# Patient Record
Sex: FEMALE | Race: WHITE | Employment: OTHER | URBAN - METROPOLITAN AREA
[De-identification: names, ages, dates, MRNs, and addresses within clinical notes are randomized per-mention and may not be internally consistent; named-entity substitution may affect disease eponyms.]

---

## 2023-04-18 ENCOUNTER — OFFICE VISIT (OUTPATIENT)
Dept: ENT CLINIC | Age: 68
End: 2023-04-18

## 2023-04-18 ENCOUNTER — OFFICE VISIT (OUTPATIENT)
Dept: ENT CLINIC | Age: 68
End: 2023-04-18
Payer: MEDICARE

## 2023-04-18 VITALS
SYSTOLIC BLOOD PRESSURE: 120 MMHG | RESPIRATION RATE: 20 BRPM | DIASTOLIC BLOOD PRESSURE: 80 MMHG | BODY MASS INDEX: 39.1 KG/M2 | HEART RATE: 92 BPM | WEIGHT: 258 LBS | HEIGHT: 68 IN | OXYGEN SATURATION: 97 %

## 2023-04-18 DIAGNOSIS — H93.8X2 PLUGGED FEELING IN EAR, LEFT: ICD-10-CM

## 2023-04-18 DIAGNOSIS — H90.3 SENSORINEURAL HEARING LOSS (SNHL) OF BOTH EARS: Primary | ICD-10-CM

## 2023-04-18 DIAGNOSIS — H90.3 ASYMMETRIC SNHL (SENSORINEURAL HEARING LOSS): ICD-10-CM

## 2023-04-18 PROCEDURE — 1101F PT FALLS ASSESS-DOCD LE1/YR: CPT | Performed by: OTOLARYNGOLOGY

## 2023-04-18 PROCEDURE — G8427 DOCREV CUR MEDS BY ELIG CLIN: HCPCS | Performed by: OTOLARYNGOLOGY

## 2023-04-18 PROCEDURE — 99213 OFFICE O/P EST LOW 20 MIN: CPT | Performed by: OTOLARYNGOLOGY

## 2023-04-18 PROCEDURE — 1123F ACP DISCUSS/DSCN MKR DOCD: CPT | Performed by: OTOLARYNGOLOGY

## 2023-04-18 PROCEDURE — G8536 NO DOC ELDER MAL SCRN: HCPCS | Performed by: OTOLARYNGOLOGY

## 2023-04-18 PROCEDURE — 3017F COLORECTAL CA SCREEN DOC REV: CPT | Performed by: OTOLARYNGOLOGY

## 2023-04-18 PROCEDURE — G8417 CALC BMI ABV UP PARAM F/U: HCPCS | Performed by: OTOLARYNGOLOGY

## 2023-04-18 PROCEDURE — G8510 SCR DEP NEG, NO PLAN REQD: HCPCS | Performed by: OTOLARYNGOLOGY

## 2023-04-18 PROCEDURE — 1090F PRES/ABSN URINE INCON ASSESS: CPT | Performed by: OTOLARYNGOLOGY

## 2023-04-18 PROCEDURE — G8400 PT W/DXA NO RESULTS DOC: HCPCS | Performed by: OTOLARYNGOLOGY

## 2023-04-18 PROCEDURE — 92557 COMPREHENSIVE HEARING TEST: CPT | Performed by: AUDIOLOGIST

## 2023-04-18 NOTE — PROGRESS NOTES
Juan Pablo Cortez, a 78y.o. year old female, was seen in ENT clinic today for a hearing evaluation on referral from Dr. Esdras Fong. Patient complains of pain and hearing loss (L>R). Patient was last seen for audiogram on 7-7-2022. Results at the time of testing indicated bilateral sensorineural hearing loss (asymmetry, L>R) with a Type C tympanogram in the left ear. Patient reports since her previous appointment where tympanometry was performed, her left ear is more \"achy\" with subjectively worsened hearing. Otoscopy: normal external ear canals and visible tympanic membranes, bilaterally. Left TM appears retracted. Tympanometry: DNT (patient unable to tolerate)    SRT: RE Speech Reception Threshold (SRT) was obtained at 10 dBHL LE Speech Reception Threshold (SRT) was obtained at 25 dBHL    WRS: RE Excellent in quiet when words were presented at 50 dBHL. WRS: LE Excellent in quiet when words were presented at 65 dBHL. Pure tone audiometry:  RE: WNL sloping to moderate-severe sensorineural hearing loss  LE: WNL sloping to moderate-severe sensorineural hearing loss    Sensorineural hearing loss, bilaterally. Asymmetry (L>R) is slightly worse in the low frequencies but overall appears stable compared to previous audiogram 7-7-2022. Impressions:  hearing loss requiring medical/otologic and audiologic follow-up    Plan:  Follow-up with ENT. Hearing aid evaluation recommended. Repeat audiogram 1 year or sooner if change is noted.     Michaelle Rock   Doctor of Audiology

## 2023-04-19 NOTE — PROGRESS NOTES
Otolaryngology-Head and Neck Surgery  Follow Up Patient Visit     Patient: Awilda Powell  YOB: 1955  MRN: [id number]  Date of Service: 4/18/2023      Chief Complaint:   Chief Complaint   Patient presents with    Follow-up     Hearing test results     Interval hx 4/18/2023  No issues since LOV  Does still have left ear plugging which she feels started after left tympanometry    History of Present Illness: Awilda Powell is a 78y.o. year old female who presents today for discussion of her ears    Has noticed some hearing loss for some time (probably back to 2008)  Admits to some noise exposure (loud music, concert etc) over the years   Has had family members complain re: her hearing    She had an audiogram with Dr Judit Blount last month and was referred to discuss findings    Since the hearing test has noticed left ear plugging sensation. Prior to that, no eustachian tube concerns       Past Medical History:  Past Medical History:   Diagnosis Date    CAD (coronary artery disease) 2008    MI    Stroke ([facility] [address] 2005       Past Surgical History:   Past Surgical History:   Procedure Laterality Date    HX ORTHOPAEDIC      removal of cyst form r and l feet       Medications:   Current Outpatient Medications   Medication Instructions    Aspirin, Buffered 81 mg tab Take  by mouth. cholecalciferol, vitamin D3, 50 mcg (2,000 unit) tab Oral    clopidogreL (PLAVIX) 75 mg tab Oral, DAILY    cyanocobalamin 1,000 mcg, Oral, DAILY    folic acid (FOLVITE) 1 mg tablet Oral, DAILY    HYDROCHLOROTHIAZIDE PO Oral    metoprolol (LOPRESSOR) 25 mg tablet Oral, 2 TIMES DAILY    pyridoxine (vitamin B6) (VITAMIN B-6) 25 mg, Oral, DAILY    SIMVASTATIN PO Oral    telmisartan (MICARDIS) 40 mg, Oral, DAILY       Allergies:    Allergies   Allergen Reactions    Codeine Nausea and Vomiting    Epinephrine Palpitations       Social History:   Social History     Tobacco Use    Smoking status: Every Day     Packs/day: 1.00     Types: Cigarettes    Smokeless tobacco: Current    Tobacco comments:     cigars   Vaping Use    Vaping Use: Never used   Substance Use Topics    Alcohol use: Yes     Comment: OCC    Drug use: Never        Family History:  Family History   Problem Relation Age of Onset    Heart Disease Father        Review of Systems:    Consitutional: denies fever, excessive weight gain or loss. Eyes: denies diplopia, eye pain. Integumentary: denies new concerning skin lesions. Ears, Nose, Mouth, Throat: denies except as per HPI. Endocrine: denies hot or cold intolerance, increased thirst.  Respiratory: denies cough, hemoptysis, wheezing  Gastrointestinal: denies trouble swallowing, nausea, emesis, regurgitation  Musculoskeletal: denies muscle weakness or wasting  Cardiovascular: denies chest pain, shortness of breath  Neurologic: denies seizures, numbness or tingling, syncope  Hematologic: denies easy bleeding or bruising    Physical Examination:   Vitals:    04/18/23 1443   BP: 120/80   Pulse: 92   Resp: 20   Height: 5' 8\" (1.727 m)   Weight: 258 lb (117 kg)   SpO2: 97%        General: Comfortable, pleasant, appears stated age  Voice: Strong, speaking in full sentences, no stridor    Face: No masses or lesions, facial strength symmetric   Ears: External ears unremarkable. Bilateral ear canal clear. Tympanic membrane clear and intact, with visible landmarks. Clear middle ear space  Nose: External nose unremarkable. Dorsum midline. Anterior rhinoscopy demonstrates no lesions. Septum midline. Turbinates without hypertrophy. Oral Cavity / Oropharynx: No trismus. Mucosa pink and moist. No lesions. Tongue is midline and mobile. Palate elevates symmetrically. Uvula midline. Tonsils unremarkable. Base of tongue soft. Floor of mouth soft. Neck: Supple. No adenopathy. Thyroid unremarkable. Palpable laryngeal landmarks. Full neck range of motion   Neurologic: CN II - XI intact.  Normal gait          Assessment and Plan:   Asymmetric SNHL  Left ear plugging  Left ETD  - Reviewed audiogram findings with patient  - Bilateral SNHL, mild asymmetry in the left ear  - This is very stable from last visit   - Suspect left ear plugging may be related to ETD  - Discussed option of medication Rx or trial myringotomy, but she defers for the time being  - Reviewed role of hearing aids which she will consider  - Follow up in 1 year     The patient was instructed to return to clinic if no improvement or progression of symptoms. Signs to watch out for reviewed.       MD [facility]  [address] [place] [address]  Office Phone: [phone]

## 2023-05-20 RX ORDER — FOLIC ACID 1 MG/1
TABLET ORAL DAILY
COMMUNITY

## 2023-05-20 RX ORDER — PYRIDOXINE HCL (VITAMIN B6) 25 MG
25 TABLET ORAL DAILY
COMMUNITY

## 2023-05-20 RX ORDER — TELMISARTAN 40 MG/1
40 TABLET ORAL DAILY
COMMUNITY

## 2023-05-20 RX ORDER — CLOPIDOGREL BISULFATE 75 MG/1
TABLET ORAL DAILY
COMMUNITY

## 2023-12-11 ENCOUNTER — ANESTHESIA (OUTPATIENT)
Facility: HOSPITAL | Age: 68
End: 2023-12-11
Payer: MEDICARE

## 2023-12-11 ENCOUNTER — ANESTHESIA EVENT (OUTPATIENT)
Facility: HOSPITAL | Age: 68
End: 2023-12-11
Payer: MEDICARE

## 2023-12-11 ENCOUNTER — HOSPITAL ENCOUNTER (OUTPATIENT)
Facility: HOSPITAL | Age: 68
Setting detail: OUTPATIENT SURGERY
Discharge: HOME | End: 2023-12-11
Attending: INTERNAL MEDICINE | Admitting: INTERNAL MEDICINE
Payer: MEDICARE

## 2023-12-11 VITALS
DIASTOLIC BLOOD PRESSURE: 71 MMHG | HEIGHT: 68 IN | RESPIRATION RATE: 18 BRPM | WEIGHT: 246.03 LBS | HEART RATE: 63 BPM | TEMPERATURE: 97.8 F | OXYGEN SATURATION: 98 % | SYSTOLIC BLOOD PRESSURE: 128 MMHG | BODY MASS INDEX: 37.29 KG/M2

## 2023-12-11 PROCEDURE — 6360000002 HC RX W HCPCS: Performed by: NURSE ANESTHETIST, CERTIFIED REGISTERED

## 2023-12-11 PROCEDURE — 3600007502: Performed by: INTERNAL MEDICINE

## 2023-12-11 PROCEDURE — 3700000000 HC ANESTHESIA ATTENDED CARE: Performed by: INTERNAL MEDICINE

## 2023-12-11 PROCEDURE — 88305 TISSUE EXAM BY PATHOLOGIST: CPT

## 2023-12-11 PROCEDURE — 7100000010 HC PHASE II RECOVERY - FIRST 15 MIN: Performed by: INTERNAL MEDICINE

## 2023-12-11 PROCEDURE — 3700000001 HC ADD 15 MINUTES (ANESTHESIA): Performed by: INTERNAL MEDICINE

## 2023-12-11 PROCEDURE — 3600007512: Performed by: INTERNAL MEDICINE

## 2023-12-11 PROCEDURE — 2580000003 HC RX 258: Performed by: INTERNAL MEDICINE

## 2023-12-11 PROCEDURE — 7100000011 HC PHASE II RECOVERY - ADDTL 15 MIN: Performed by: INTERNAL MEDICINE

## 2023-12-11 RX ORDER — ZINC GLUCONATE 50 MG
50 TABLET ORAL DAILY
COMMUNITY

## 2023-12-11 RX ORDER — ALPRAZOLAM 0.25 MG/1
0.25 TABLET ORAL 2 TIMES DAILY
COMMUNITY
Start: 2021-12-02

## 2023-12-11 RX ORDER — SODIUM CHLORIDE 0.9 % (FLUSH) 0.9 %
5-40 SYRINGE (ML) INJECTION EVERY 12 HOURS SCHEDULED
Status: DISCONTINUED | OUTPATIENT
Start: 2023-12-11 | End: 2023-12-11 | Stop reason: HOSPADM

## 2023-12-11 RX ORDER — METOPROLOL SUCCINATE 50 MG/1
50 TABLET, EXTENDED RELEASE ORAL DAILY
COMMUNITY
Start: 2023-11-01

## 2023-12-11 RX ORDER — ROSUVASTATIN CALCIUM 20 MG/1
20 TABLET, COATED ORAL
COMMUNITY
Start: 2021-10-09

## 2023-12-11 RX ORDER — PROPOFOL 10 MG/ML
INJECTION, EMULSION INTRAVENOUS CONTINUOUS PRN
Status: DISCONTINUED | OUTPATIENT
Start: 2023-12-11 | End: 2023-12-11 | Stop reason: SDUPTHER

## 2023-12-11 RX ORDER — OMEPRAZOLE 40 MG/1
40 CAPSULE, DELAYED RELEASE ORAL DAILY
COMMUNITY
Start: 2021-10-15

## 2023-12-11 RX ORDER — SODIUM CHLORIDE 0.9 % (FLUSH) 0.9 %
5-40 SYRINGE (ML) INJECTION PRN
Status: DISCONTINUED | OUTPATIENT
Start: 2023-12-11 | End: 2023-12-11 | Stop reason: HOSPADM

## 2023-12-11 RX ORDER — LIDOCAINE HYDROCHLORIDE 20 MG/ML
INJECTION, SOLUTION INTRAVENOUS PRN
Status: DISCONTINUED | OUTPATIENT
Start: 2023-12-11 | End: 2023-12-11 | Stop reason: SDUPTHER

## 2023-12-11 RX ORDER — SODIUM CHLORIDE 9 MG/ML
25 INJECTION, SOLUTION INTRAVENOUS PRN
Status: DISCONTINUED | OUTPATIENT
Start: 2023-12-11 | End: 2023-12-11 | Stop reason: HOSPADM

## 2023-12-11 RX ADMIN — SODIUM CHLORIDE: 9 INJECTION, SOLUTION INTRAVENOUS at 11:05

## 2023-12-11 RX ADMIN — PROPOFOL 120 MCG/KG/MIN: 10 INJECTION, EMULSION INTRAVENOUS at 11:22

## 2023-12-11 RX ADMIN — LIDOCAINE HYDROCHLORIDE 60 MG: 20 INJECTION, SOLUTION INTRAVENOUS at 11:22

## 2023-12-11 RX ADMIN — SODIUM CHLORIDE: 9 INJECTION, SOLUTION INTRAVENOUS at 11:39

## 2023-12-11 RX ADMIN — PROPOFOL 100 MG: 10 INJECTION, EMULSION INTRAVENOUS at 11:23

## 2023-12-11 RX ADMIN — LIDOCAINE HYDROCHLORIDE 40 MG: 20 INJECTION, SOLUTION INTRAVENOUS at 11:29

## 2023-12-11 ASSESSMENT — PAIN - FUNCTIONAL ASSESSMENT: PAIN_FUNCTIONAL_ASSESSMENT: 0-10

## 2023-12-11 ASSESSMENT — PAIN SCALES - GENERAL: PAINLEVEL_OUTOF10: 0

## 2023-12-11 NOTE — ANESTHESIA PRE PROCEDURE
Department of Anesthesiology  Preprocedure Note       Name:  Petra Emmanuel   Age:  80 y.o.  :  1955                                          MRN:  [id number]         Date:  2023      Surgeon: Liat Stringer):  Sharon Chavez MD    Procedure: Procedure(s):  COLONOSCOPY DIAGNOSTIC    Medications prior to admission:   Prior to Admission medications    Medication Sig Start Date End Date Taking? Authorizing Provider   omeprazole (PRILOSEC) 40 MG delayed release capsule Take 1 capsule by mouth daily 10/15/21  Yes Provider, MD Ruma   rosuvastatin (CRESTOR) 20 MG tablet 1 tablet 10/9/21  Yes Provider, MD Ruma   metoprolol succinate (TOPROL XL) 50 MG extended release tablet Take 1 tablet by mouth daily 23  Yes Provider, MD Ruma   ALPRAZolam (XANAX) 0.25 MG tablet Take 1 tablet by mouth 2 times daily.  Max Daily Amount: 0.5 mg 21  Yes Provider, MD Ruma   zinc gluconate 50 MG tablet Take 1 tablet by mouth daily    Provider, MD Ruma   HYDROCHLOROTHIAZIDE PO Take by mouth    Automatic Reconciliation, Ar   Cholecalciferol 50 MCG ( UT) TABS Take by mouth    Automatic Reconciliation, Ar   clopidogrel (PLAVIX) 75 MG tablet Take by mouth daily  Patient not taking: Reported on 2023    Automatic Reconciliation, Ar   cyanocobalamin 1000 MCG tablet Take 1 tablet by mouth daily    Automatic Reconciliation, Ar   folic acid (FOLVITE) 1 MG tablet Take by mouth daily    Automatic Reconciliation, Ar   pyridoxine (B-6) 25 MG tablet Take 1 tablet by mouth daily    Automatic Reconciliation, Ar   telmisartan (MICARDIS) 40 MG tablet Take 1 tablet by mouth daily    Automatic Reconciliation, Ar       Current medications:    Current Facility-Administered Medications   Medication Dose Route Frequency Provider Last Rate Last Admin    sodium chloride flush 0.9 % injection 5-40 mL  5-40 mL IntraVENous 2 times per day Sharon Chavez MD        sodium chloride flush 0.9 % injection 5-40 mL  5-40 mL

## 2023-12-11 NOTE — DISCHARGE INSTRUCTIONS
Jud Ríos  [id number]  1955    DISCHARGE INSTRUCTIONS    Impression:  Hemorrhoids are the source of bleeding; have specifically recommended high-fiber diet, MiraLAX or similar daily  Incidental finding small colon polyps    Recommendations:     - Repeat colonoscopy in 5 years. - Follow up with primary care physician. Pertaining to cardiac status    Discomfort:  Redness at IV site- apply warm compress to area; if redness or soreness persist- contact your physician. There may be a slight amount of blood passed from the rectum. Gaseous discomfort - walking, belching will help relieve any discomfort. You may not operate a vehicle for 12 hours. You may not engage in an occupation involving machinery or appliances for rest of today. You may not drink alcoholic beverages for at least 12 hours. Avoid making any critical decisions for at least 24 hours. DIET:   High fiber diet. Medications:                Resume usual medications today   ACTIVITY:  You may resume your normal daily activities it is recommended that you spend the remainder of the day resting -  avoid any strenuous activity. CALL M.D. ANY SIGN OF:   Increasing pain, nausea, vomiting  Abdominal distension (swelling)  New increased bleeding (oral or rectal)  Fever (chills)  Pain in chest area  Bloody discharge from nose or mouth  Shortness of breath     Follow-up Instructions:  Call Dr. Júnior Hayward if you have any questions or problems.           DISCHARGE SUMMARY from Nurse    The following personal items collected during your admission are returned to you:   Dental Appliance:    Vision:    Hearing Aid:    Jewelry:    Clothing:    Other Valuables:    Valuables sent to safe: Dose (mL/hr) Propofol : 0 mL/hr chronic ulcer sent for evaluation of osteo, does not meet sepsis criteria pending labs -

## 2023-12-11 NOTE — PERIOP NOTE
Situation:  Verbal report received from: Ramin Smith RN  Procedure: Procedure(s):  COLONOSCOPY DIAGNOSTIC  COLONOSCOPY POLYPECTOMY SNARE/COLD BIOPSY     Background:      :  Dr. Estefany Dobson      Specimens: venecia  H. Pylori  No    Assessment:  Intra-procedure medications     Anesthesia gave intra-procedure sedation and medications, see anesthesia flow sheet Yes    Intravenous fluids: NS@ KVO     Vital signs stable yes    Abdominal assessment: round and soft yes    Recommendation:  Discharge patient per MD order yes.     Family or Friend friend  Permission to share finding with family or friend No

## 2023-12-11 NOTE — PROGRESS NOTES
TRANSFER - IN REPORT:    Verbal report received from [address] on Jadon Monique  being received from [address] for routine post-op      Report consisted of patient's Situation, Background, Assessment and   Recommendations(SBAR). Information from the following report(s) MAR was reviewed with the receiving nurse. Opportunity for questions and clarification was provided. Assessment completed upon patient's arrival to unit and care assumed.

## 2023-12-11 NOTE — PROGRESS NOTES
1115  Timeout performed. Anesthesia staff at patient's bedside administering anesthesia and monitoring patients vital signs throughout procedure. See anesthesia note. Post procedure, report received from CRNA,    [address]  Endoscope was pre-cleaned at bedside immediately following procedure by endo Hugh nieves rn      [id number]  Patient tolerated procedure. Abdomen soft and patient arousable and voices no complaints. Patient transported to endoscopy recovery area.    Report given to post procedure RNShannan rn

## 2023-12-11 NOTE — ANESTHESIA POSTPROCEDURE EVALUATION
Department of Anesthesiology  Postprocedure Note    Patient: Erika Yuan  MRN: [id number]  YOB: 1955  Date of evaluation: 12/11/2023      Procedure Summary       Date: 12/11/23 Room / Location: [facility] ENDO  / [facility] ENDOSCOPY    Anesthesia Start: 1111 Anesthesia Stop: 1150    Procedures:       COLONOSCOPY DIAGNOSTIC (Lower GI Region)      COLONOSCOPY POLYPECTOMY SNARE/COLD BIOPSY (Lower GI Region) Diagnosis:       Rectal hemorrhage      (Rectal hemorrhage [K62.5])    Surgeons: Rehana العلي MD Responsible Provider: Marlene Vital MD    Anesthesia Type: MAC ASA Status: 3            Anesthesia Type: No value filed.     Charisma Phase I: Charisma Score: 10    Charisma Phase II: Charisma Score: 10      Anesthesia Post Evaluation    Patient location during evaluation: bedside  Patient participation: complete - patient participated  Level of consciousness: awake and alert and responsive to verbal stimuli  Pain score: 0  Airway patency: patent  Nausea & Vomiting: no nausea and no vomiting  Complications: no  Cardiovascular status: hemodynamically stable  Respiratory status: acceptable and room air  Hydration status: stable  Pain management: adequate

## 2023-12-11 NOTE — OP NOTE
[address] Padilla MD  [phone]      2023    Colonoscopy Procedure Note  Erika Yuan  :  1955  [facility] Medical Record Number: [id number]    Indications:     Lower rectal bleeding  PCP:  Carol Guardado MD  Anesthesia/Sedation: see nursing notes  Endoscopist:  Dr. Rehana العلي  Assistants: None  Complications:  None  Estimate Blood Loss:  None    Permit:  The indications, risks, benefits and alternatives were reviewed with the patient or their decision maker who was provided an opportunity to ask questions and all questions were answered. The specific risks of colonoscopy with conscious sedation were reviewed, including but not limited to anesthetic complication, bleeding, adverse drug reaction, missed lesion, infection, IV site reactions, and intestinal perforation which would lead to the need for surgical repair. Alternatives to colonoscopy including radiographic imaging, observation without testing, or laboratory testing were reviewed including the limitations of those alternatives. After considering the options and having all their questions answered, the patient or their decision maker provided both verbal and written consent to proceed. Procedure in Detail:  After obtaining informed consent, positioning of the patient in the left lateral decubitus position, and conduction of a pre-procedure pause or \"time out\" the endoscope was introduced into the anus and advanced to the cecum, which was identified by the ileocecal valve and appendiceal orifice. The quality of the colonic preparation was good. A careful inspection was made as the colonoscope was withdrawn, findings and interventions are described below.   During the course of the examination there is wide variation as to cardiac rate and rhythm  Appendiceal orifice photographed    Findings:   4 polyps identified none of

## 2023-12-11 NOTE — PROGRESS NOTES
Endoscopy discharge instructions have been reviewed and given to patient. The patient verbalized understanding and acceptance of instructions. Dr. Nadia Vora discussed with patient procedure findings and next steps.

## 2023-12-11 NOTE — H&P
Patient Name: Niharika Marte  Gender: Female   (age): 1955 (67)       Referring Physician:    Karen Benítez  [id number] [place] [address],[address], [place], [address] [place] [address]  [phone] (phone)  [phone] (fax)     Chief Complaint:    GERD (follow-up), blood in stool     History of Present Illness:  Personal history of GERD; when using medication daily does not have indigestion, heartburn, dysphagia, vomiting. For an uncertain time. Has had variable amounts of bright red blood per rectum; denies constipation, diarrhea, anal rectal pain. Past Medical History  Medical Conditions:   Arthritis  Atrial Fibrillation  Hemorrhoids  Hepatitis C  Hepatitis: (type)________________________  High blood pressure  High cholesterol  Ischemic Heart Disease  Liver disease  Stroke/ CVA  Surgical Procedures:   History of anesthesia complications:  Dx Studies:   Abdominal U/S  Barium Swallow  Colonoscopy  CT Scan  Liver Biopsy  UGI/SBS, 10/4/2019, small hiatal hernia with mild reflux otherwise normal  Medications:   alprazolam 0.25 mg Take 1 tablet by mouth once a day as directed  Eliquis 5 mg Take 1 tablet by mouth once a day as directed  metoprolol succinate 50 mg Take 1 tablet by mouth once a day as directed  omeprazole 40 mg Take 1 capsule by mouth every morning for 30 days  rosuvastatin 20 mg Take 1 tablet by mouth once a day as directed  telmisartan 40 mg Take 1 tablet by mouth once a day as directed  venlafaxine 37.5 mg Take 1 capsule by mouth once a day as directed  Allergies:   Codiene  EPPY  Social History  Alcohol:   None  Tobacco:   Current some day smoker  Drugs:   None  Exercise:   Exercise less than 3 times a week. Caffeine:   Daily. Marital Status:    Unknown     Family History   Sister: Diagnosed with cancer; Other: Diagnosed with Complication of anesthesia, Family history of colon polyps; Review of Systems:  Cardiovascular: Presents suffers from chest pain.  Denies irregular heart beat,

## 2024-01-09 ENCOUNTER — TELEPHONE (OUTPATIENT)
Age: 69
End: 2024-01-09

## 2024-01-09 NOTE — TELEPHONE ENCOUNTER
Attempted to reach pt to cancel upcoming appt with Dr. Tabor due to her no longer being with the practice, lvm asking pt to call back regarding this matter.